# Patient Record
Sex: FEMALE | Race: WHITE
[De-identification: names, ages, dates, MRNs, and addresses within clinical notes are randomized per-mention and may not be internally consistent; named-entity substitution may affect disease eponyms.]

---

## 2021-04-19 ENCOUNTER — HOSPITAL ENCOUNTER (EMERGENCY)
Dept: HOSPITAL 5 - ED | Age: 81
Discharge: LEFT BEFORE DISCHARGE | End: 2021-04-19
Payer: MEDICARE

## 2021-04-19 VITALS — DIASTOLIC BLOOD PRESSURE: 57 MMHG | SYSTOLIC BLOOD PRESSURE: 130 MMHG

## 2021-04-19 DIAGNOSIS — Z53.21: ICD-10-CM

## 2021-04-19 DIAGNOSIS — H92.01: Primary | ICD-10-CM

## 2021-04-19 NOTE — EVENT NOTE
ED Screening Note


Date of service: 04/19/21


Time: 14:09


ED Screening Note: 


80-year-old female patient with history of diabetes, hypertension, and end-stage

renal disease on dialysis presents to the emergency department with complaints 

of progressively worsening right-sided headache for several months.  Patient 

states she feels like "her scalp is burning."  Patient attends dialysis 

Monday/Wednesday/Friday.  She did go to dialysis earlier today.  No history of 

head trauma.  





General: Awake, appropriately interactive, no acute distress. 


ENT: Normal otoscopic exam.


Neck: Supple. Full range of motion intact. 


Cardiovascular: Normal peripheral perfusion. 


Pulmonary: No respiratory distress. Patient is speaking normally without use of 

accessory muscles.


Skin: No apparent rashes or lesions. 


Neurological: No facial asymmetry. Speech is clear. Follows commands. Patient is

alert and oriented. 


Musculoskeletal: Moves all four extremities spontaneously with normal range of 

motion. 


Psych: Cooperative. Appropriate mood and affect.





I have greeted and performed a focused rapid initial assessment of this patient.

 A comprehensive ED assessment and evaluation of the patient, analysis of all 

test results, and completion of the medical decision-making process will be 

conducted by additional ED providers. This initial assessment/diagnostic 

orders/clinical plan/treatment(s) is/are subject to change based on patients 

health status, clinical progression and re-assessment. Further treatment and 

workup at subsequent clinical provider's discretion. Patient/guardian urged not 

to elope from the ED as their condition may be serious if not clinically 

assessed and managed.

## 2021-05-08 ENCOUNTER — HOSPITAL ENCOUNTER (EMERGENCY)
Dept: HOSPITAL 5 - ED | Age: 81
Discharge: HOME | End: 2021-05-08
Payer: MEDICARE

## 2021-05-08 VITALS — SYSTOLIC BLOOD PRESSURE: 124 MMHG | DIASTOLIC BLOOD PRESSURE: 78 MMHG
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
## 2021-05-08 DIAGNOSIS — Z79.899: ICD-10-CM

## 2021-05-08 DIAGNOSIS — R53.81: Primary | ICD-10-CM

## 2021-05-08 DIAGNOSIS — I10: ICD-10-CM

## 2021-05-08 DIAGNOSIS — T50.B95A: ICD-10-CM

## 2021-05-08 DIAGNOSIS — Z90.710: ICD-10-CM

## 2021-05-08 LAB
ALBUMIN SERPL-MCNC: 3.6 G/DL (ref 3.9–5)
ALT SERPL-CCNC: 17 UNITS/L (ref 7–56)
BAND NEUTROPHILS # (MANUAL): 0 K/MM3
BUN SERPL-MCNC: 34 MG/DL (ref 7–17)
BUN/CREAT SERPL: 5 %
CALCIUM SERPL-MCNC: 9.3 MG/DL (ref 8.4–10.2)
HCT VFR BLD CALC: 39.1 % (ref 30.3–42.9)
HEMOLYSIS INDEX: 3
HGB BLD-MCNC: 12.3 GM/DL (ref 10.1–14.3)
MCHC RBC AUTO-ENTMCNC: 32 % (ref 30–34)
MCV RBC AUTO: 88 FL (ref 79–97)
MYELOCYTES # (MANUAL): 0 K/MM3
PLATELET # BLD: 165 K/MM3 (ref 140–440)
PROMYELOCYTES # (MANUAL): 0 K/MM3
RBC # BLD AUTO: 4.42 M/MM3 (ref 3.65–5.03)
TOTAL CELLS COUNTED BLD: 100

## 2021-05-08 PROCEDURE — 85025 COMPLETE CBC W/AUTO DIFF WBC: CPT

## 2021-05-08 PROCEDURE — 85007 BL SMEAR W/DIFF WBC COUNT: CPT

## 2021-05-08 PROCEDURE — 80053 COMPREHEN METABOLIC PANEL: CPT

## 2021-05-08 PROCEDURE — 71046 X-RAY EXAM CHEST 2 VIEWS: CPT

## 2021-05-08 PROCEDURE — 36415 COLL VENOUS BLD VENIPUNCTURE: CPT

## 2021-05-08 NOTE — XRAY REPORT
CHEST 2 VIEWS 



INDICATION / CLINICAL INFORMATION:

cough.



COMPARISON: 

None available.



FINDINGS:



SUPPORT DEVICES: None.



HEART / MEDIASTINUM: No significant abnormality. 



LUNGS / PLEURA: No significant pulmonary or pleural abnormality. No pneumothorax. 



ADDITIONAL FINDINGS: There are some likely venous vascular stents in the chest.



IMPRESSION:

1. No acute findings.



Signer Name: Moise Packer MD 

Signed: 5/8/2021 8:56 AM

Workstation Name: [id number]

## 2021-05-08 NOTE — EVENT NOTE
ED Screening Note


Date of service: 05/08/21


Time: 08:29


ED Screening Note: 


This 80-year-old female with end-stage renal disease on dialysis 3 times a day 

last dialyzed yesterday presents to ED complaining of generalized body aches, 

coughing and bilateral leg pain times couple of days.





This initial assessment/diagnostic orders/clinical plan/treatment(s) is/are 

subject to change based on patients health status, clinical progression and re-

assessment by fellow clinical providers in the ED. Further treatment and workup 

at subsequent clinical providers discretion. Patient/guardian urged not to elope

from the ED as their condition may be serious if not clinically assessed and 

managed. 





Initial orders include: 


Labs, chest x-ray.

## 2021-05-08 NOTE — EMERGENCY DEPARTMENT REPORT
ED General Adult HPI





- General


Chief complaint: Pain General


Stated complaint: BODY PAIN


Time Seen by Provider: 05/08/21 09:31


Source: patient


Mode of arrival: Ambulatory


Limitations: No Limitations





- History of Present Illness


Initial comments: 





Chief complaint: My whole body hurts.





HPI: This is a 80-year-old female with history of hypertension, end-stage renal 

disease on hemodialysis for the past 15 years.  She presents with diffuse body 

aches 2 days after receiving second dose of COVID-19 vaccine.  She did not have 

any side effects with the first dose.  She has chills cough body aches 

generalized fatigue.  Last hemodialysis on yesterday.


-: Gradual, days(s) (Past 3 days, 2 days after receiving COVID-19 vaccine)


Location: head, neck, left, right, upper extremity, lower extremity


Quality: aching


Consistency: constant


Improves with: medication (Tylenol)


Worsens with: none


Associated Symptoms: cough, fever/chills (Chills), malaise





- Related Data


                                  Previous Rx's











 Medication  Instructions  Recorded  Last Taken  Type


 


traMADoL [Ultram 50 MG tab] 50 mg PO Q6HR PRN #15 tablet 05/08/21 Unknown Rx











                                    Allergies











Allergy/AdvReac Type Severity Reaction Status Date / Time


 


No Known Allergies Allergy   Unverified 05/08/21 08:02














ED Review of Systems


ROS: 


Stated complaint: BODY PAIN


Other details as noted in HPI





Comment: All other systems reviewed and negative


Constitutional: chills, malaise.  denies: fever


Respiratory: cough


Gastrointestinal: denies: abdominal pain, nausea, vomiting


Musculoskeletal: myalgia





ED Past Medical Hx





- Past Medical History


Previous Medical History?: Yes


Hx Hypertension: Yes


Hx Renal Disease: Yes (Dialysis pt)





- Surgical History


Past Surgical History?: Yes


Additional Surgical History: Hysterectomy





- Social History


Smoking Status: Never Smoker


Substance Use Type: None





- Medications


Home Medications: 


                                Home Medications











 Medication  Instructions  Recorded  Confirmed  Last Taken  Type


 


traMADoL [Ultram 50 MG tab] 50 mg PO Q6HR PRN #15 tablet 05/08/21  Unknown Rx














ED Physical Exam





- General


Limitations: No Limitations


General appearance: alert, in no apparent distress, other (Appears well appears 

comfortable nontoxic-appearing)





- Head


Head exam: Present: atraumatic, normocephalic





- Eye


Eye exam: Present: normal appearance





- ENT


ENT exam: Present: mucous membranes moist





- Neck


Neck exam: Present: normal inspection, full ROM





- Respiratory


Respiratory exam: Present: normal lung sounds bilaterally.  Absent: respiratory 

distress, wheezes, rales, rhonchi





- Cardiovascular


Cardiovascular Exam: Present: regular rate, normal rhythm, normal heart sounds. 

Absent: systolic murmur, diastolic murmur, rubs, gallop





- GI/Abdominal


GI/Abdominal exam: Present: soft, normal bowel sounds.  Absent: distended, 

tenderness, guarding, rebound





- Extremities Exam


Extremities exam: Present: normal inspection





- Neurological Exam


Neurological exam: Present: alert, oriented X3





- Psychiatric


Psychiatric exam: Present: normal affect, normal mood





- Skin


Skin exam: Present: warm, dry, intact, normal color.  Absent: rash





ED Course





                                   Vital Signs











  05/08/21 05/08/21





  07:57 09:49


 


Temperature 98.5 F 


 


Pulse Rate 98 H 88


 


Respiratory 12 18





Rate  


 


Blood Pressure 125/62 


 


Blood Pressure  124/78





[Left]  


 


O2 Sat by Pulse 97 98





Oximetry  














ED Medical Decision Making





- Lab Data


Result diagrams: 


                                 05/08/21 09:15





                                 05/08/21 09:15








                         Laboratory Results - last 24 hr











  05/08/21 05/08/21





  09:15 09:15


 


WBC  5.5 


 


RBC  4.42 


 


Hgb  12.3 


 


Hct  39.1 


 


MCV  88 


 


MCH  28 


 


MCHC  32 


 


RDW  17.3 H 


 


Plt Count  165 


 


Mono % (Auto)  Np 


 


Sodium   143


 


Potassium   4.4


 


Chloride   100.7


 


Carbon Dioxide   30


 


Anion Gap   17


 


BUN   34 H


 


Creatinine   6.7 H


 


Estimated GFR   7


 


BUN/Creatinine Ratio   5


 


Glucose   81


 


Calcium   9.3


 


Total Bilirubin   0.30


 


AST   23


 


ALT   17


 


Alkaline Phosphatase   182 H


 


Total Protein   7.3


 


Albumin   3.6 L


 


Albumin/Globulin Ratio   1.0














- Radiology Data


Radiology results: report reviewed





Chest radiograph: No acute findings according to radiology report





- Medical Decision Making





Clinical impression: Adverse effects of COVID-19 vaccine, secondary 

consideration COVID-19 infection in spite COVID-19 vaccine, no evidence of s

epsis.  CBC chemistry unremarkable.  Chemistry reflects end-stage renal disease 

with normal potassium no acidosis. 





Vital signs are normal.  Patient appears well.  I prescribed tramadol.  

Recommended increase rest and hydration as well as continue Tylenol with chest 

helped her symptoms.  She recently moved from [place] to be closer to her

 granddaughter who lives in [facility].  I have referred her to our 

outpatient medicine physician for follow-up as needed.


Critical care attestation.: 


If time is entered above; I have spent that time in minutes in the direct care 

of this critically ill patient, excluding procedure time.








ED Disposition


Clinical Impression: 


 Adverse effect of COVID-19 vaccine





Disposition: DC-01 TO HOME OR SELFCARE


Is pt being admited?: No


Does the pt Need Aspirin: No


Condition: Stable


Additional Instructions: 


Please return to the ER if your symptoms worsen.  Otherwise, rest and hydrate.  

Continue Tylenol use.


Prescriptions: 


traMADoL [Ultram 50 MG tab] 50 mg PO Q6HR PRN #15 tablet


 PRN Reason: Pain


Referrals: 


AMERICO MIRANDA [Primary Care Provider] - 3-5 Days


KODY DOLAN MD [Staff Physician] - 3-5 Days

## 2021-08-06 ENCOUNTER — HOSPITAL ENCOUNTER (INPATIENT)
Dept: HOSPITAL 5 - ED | Age: 81
LOS: 5 days | Discharge: HOME HEALTH | DRG: 871 | End: 2021-08-11
Attending: INTERNAL MEDICINE | Admitting: INTERNAL MEDICINE
Payer: MEDICARE

## 2021-08-06 DIAGNOSIS — Z99.2: ICD-10-CM

## 2021-08-06 DIAGNOSIS — Z20.822: ICD-10-CM

## 2021-08-06 DIAGNOSIS — D72.829: ICD-10-CM

## 2021-08-06 DIAGNOSIS — D50.0: ICD-10-CM

## 2021-08-06 DIAGNOSIS — E66.9: ICD-10-CM

## 2021-08-06 DIAGNOSIS — N18.6: ICD-10-CM

## 2021-08-06 DIAGNOSIS — E87.70: ICD-10-CM

## 2021-08-06 DIAGNOSIS — A41.9: Primary | ICD-10-CM

## 2021-08-06 DIAGNOSIS — I12.0: ICD-10-CM

## 2021-08-06 DIAGNOSIS — J96.01: ICD-10-CM

## 2021-08-06 DIAGNOSIS — Z90.710: ICD-10-CM

## 2021-08-06 DIAGNOSIS — J18.9: ICD-10-CM

## 2021-08-06 DIAGNOSIS — E87.1: ICD-10-CM

## 2021-08-06 DIAGNOSIS — Z82.49: ICD-10-CM

## 2021-08-06 LAB
ALBUMIN SERPL-MCNC: 3.8 G/DL (ref 3.9–5)
ALT SERPL-CCNC: 14 UNITS/L (ref 7–56)
BUN SERPL-MCNC: 55 MG/DL (ref 7–17)
BUN/CREAT SERPL: 5 %
CALCIUM SERPL-MCNC: 9.6 MG/DL (ref 8.4–10.2)
HCT VFR BLD CALC: 33.1 % (ref 30.3–42.9)
HEMOLYSIS INDEX: 8
HGB BLD-MCNC: 10.7 GM/DL (ref 10.1–14.3)
INR PPP: 1.23 (ref 0.87–1.13)
MCHC RBC AUTO-ENTMCNC: 32 % (ref 30–34)
MCV RBC AUTO: 90 FL (ref 79–97)
PLATELET # BLD: 155 K/MM3 (ref 140–440)
RBC # BLD AUTO: 3.68 M/MM3 (ref 3.65–5.03)

## 2021-08-06 PROCEDURE — 96374 THER/PROPH/DIAG INJ IV PUSH: CPT

## 2021-08-06 PROCEDURE — 93005 ELECTROCARDIOGRAM TRACING: CPT

## 2021-08-06 PROCEDURE — 85007 BL SMEAR W/DIFF WBC COUNT: CPT

## 2021-08-06 PROCEDURE — 71045 X-RAY EXAM CHEST 1 VIEW: CPT

## 2021-08-06 PROCEDURE — 82805 BLOOD GASES W/O2 SATURATION: CPT

## 2021-08-06 PROCEDURE — 87040 BLOOD CULTURE FOR BACTERIA: CPT

## 2021-08-06 PROCEDURE — 80074 ACUTE HEPATITIS PANEL: CPT

## 2021-08-06 PROCEDURE — 85025 COMPLETE CBC W/AUTO DIFF WBC: CPT

## 2021-08-06 PROCEDURE — 36415 COLL VENOUS BLD VENIPUNCTURE: CPT

## 2021-08-06 PROCEDURE — 80053 COMPREHEN METABOLIC PANEL: CPT

## 2021-08-06 PROCEDURE — 85610 PROTHROMBIN TIME: CPT

## 2021-08-06 PROCEDURE — 82962 GLUCOSE BLOOD TEST: CPT

## 2021-08-06 PROCEDURE — 80048 BASIC METABOLIC PNL TOTAL CA: CPT

## 2021-08-06 PROCEDURE — U0003 INFECTIOUS AGENT DETECTION BY NUCLEIC ACID (DNA OR RNA); SEVERE ACUTE RESPIRATORY SYNDROME CORONAVIRUS 2 (SARS-COV-2) (CORONAVIRUS DISEASE [COVID-19]), AMPLIFIED PROBE TECHNIQUE, MAKING USE OF HIGH THROUGHPUT TECHNOLOGIES AS DESCRIBED BY CMS-2020-01-R: HCPCS

## 2021-08-06 PROCEDURE — 82140 ASSAY OF AMMONIA: CPT

## 2021-08-06 NOTE — XRAY REPORT
CHEST 1 VIEW 8/6/2021 8:53 PM



INDICATION / CLINICAL INFORMATION: possible Sepsis.



COMPARISON: 5/8/2021.



FINDINGS:



SUPPORT DEVICES: None.

HEART / MEDIASTINUM: No significant abnormality. 

LUNGS / PLEURA: Dense infiltrate right upper lobe. No pneumothorax. 



ADDITIONAL FINDINGS: No significant additional findings.



IMPRESSION: Right upper lobe pneumonia. Recommend follow-up to resolution.



Signer Name: Joce Martinez MD 

Signed: 8/6/2021 9:57 PM

Workstation Name: [id number]

## 2021-08-07 LAB
ANISOCYTOSIS BLD QL SMEAR: (no result)
BAND NEUTROPHILS # (MANUAL): 0 K/MM3
MYELOCYTES # (MANUAL): 0 K/MM3
PROMYELOCYTES # (MANUAL): 0 K/MM3
TOTAL CELLS COUNTED BLD: 100

## 2021-08-07 PROCEDURE — 5A1D70Z PERFORMANCE OF URINARY FILTRATION, INTERMITTENT, LESS THAN 6 HOURS PER DAY: ICD-10-PCS | Performed by: INTERNAL MEDICINE

## 2021-08-07 RX ADMIN — CEFTRIAXONE SODIUM SCH MLS/HR: 2 INJECTION, POWDER, FOR SOLUTION INTRAMUSCULAR; INTRAVENOUS at 11:15

## 2021-08-07 RX ADMIN — AZITHROMYCIN SCH MLS/HR: 500 INJECTION, POWDER, LYOPHILIZED, FOR SOLUTION INTRAVENOUS at 21:56

## 2021-08-07 NOTE — CONSULTATION
History of Present Illness





- Reason for Consult


end stage renal disease


Requesting physician: AMY J KOCHERLA





- History of Present Illness





This is a 79 yo F with past medical history of Hypertension, ESRD on HD MWF, who

presents with fever, SOB, WILKINSON,  productive cough. Pt also reports multiple falls

over the lat 2 days. Pt denies loss of consciousness, reports the falls were 

mechanical in nature. She received 2 doses of the Covid vaccine in March and 

April. CXR showed evidence of RUL pneumonia. Pt reports that her last HD was 

performed on Wed. Could not go to her maintenance HD yesterday. Renal consult is

requested for management of ESRD/HD. 





Past History


Past Medical History: dialysis, ESRD, hypertension





Medications and Allergies


                                    Allergies











Allergy/AdvReac Type Severity Reaction Status Date / Time


 


No Known Allergies Allergy   Unverified 05/08/21 08:02











                                Home Medications











 Medication  Instructions  Recorded  Confirmed  Last Taken  Type


 


traMADoL [Ultram 50 MG tab] 50 mg PO Q6HR PRN #15 tablet 05/08/21  Unknown Rx











Active Meds: 


Active Medications





Ceftriaxone Sodium (Rocephin/Ns 2 Gm/100 Ml)  2 gm in 100 mls @ 200 mls/hr IV 

Q24H [facility]; Protocol


   Last Admin: 08/07/21 11:15 Dose:  200 mls/hr


   Documented by: 


Sodium Chloride (Nacl 0.9%)  100 mls @ 999 mls/hr IV MICHELLE PRN


   PRN Reason: Hypotension











Review of Systems


All systems: negative





Exam





- Vital Signs


Vital signs: 


                                   Vital Signs











Temp Pulse Resp BP Pulse Ox


 


 103.1 F H  121 H  16   127/54   93 


 


 08/06/21 21:22  08/06/21 21:22  08/06/21 21:22  08/06/21 21:22  08/06/21 21:22














Results





- Lab Results





                                 08/06/21 21:40





                                 08/06/21 21:40


                             Most recent lab results











Calcium  9.6 mg/dL (8.4-10.2)   08/06/21  21:40    














Assessment and Plan





- Patient Problems


(1) End-stage renal disease on hemodialysis


Current Visit: Yes   Status: Acute   


Plan to address problem: 


HD arranged for today with target UF at 1-2L as tolerated. Pt understands risks 

and benefit of maintenance HD, consented to HD. Will resume HD on MWF schedule 








(2) Community acquired pneumonia


Current Visit: Yes   Status: Acute   


Plan to address problem: 


cont ABX treatment cetriaxone 








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 

end stage renal disease


Current Visit: Yes   Status: Acute   


Plan to address problem: 


monitor BP off meds 








(4) Anemia in chronic illness


Current Visit: Yes   Status: Acute   


Plan to address problem: 


Hb at target. no further EPO needed.

## 2021-08-07 NOTE — HISTORY AND PHYSICAL REPORT
History of Present Illness


Date of examination: 08/07/21


Date of admission: 


8/7/2021


Chief complaint: 


Fever, cough and worsening shortness of breath





History of present illness: 


79 yo female patient with hx of ESRD on HD MWF, hypertension  presents to the 

emergency room with fever cough brown-yellow mucus production.  Patient also 

gives history of recurrent falls , She has fever, chills, sore throat, headache,

poor appetite diarrhea.  She received 2 doses of the Covid vaccine in March and 

April. ,  Patient missed her dialysis yesterday as she was not feeling well.


Initial work-up in the emergency room is consistent with, right upper lobe 

pneumonia on chest x-ray, mild leukocytosis, mild hyponatremia probably 

secondary to fluid overload.


Though the patient was vaccinated, in view of fever pneumonia and cough, high 

suspicion for COVID-19, corona PCR was sent.


Patient feels slightly better this morning.


Denies headache dizziness , however complains of generalized weakness


Denies nausea vomiting or abdominal pain


Denies chest pain or palpitation








Past History


Past Medical History: dialysis, ESRD, hypertension


Past Surgical History: Other (AV graft)


Social history: denies: smoking, alcohol abuse, prescription drug abuse


Family history: hypertension





Medications and Allergies


                                    Allergies











Allergy/AdvReac Type Severity Reaction Status Date / Time


 


No Known Allergies Allergy   Unverified 05/08/21 08:02











                                Home Medications











 Medication  Instructions  Recorded  Confirmed  Last Taken  Type


 


traMADoL [Ultram 50 MG tab] 50 mg PO Q6HR PRN #15 tablet 05/08/21  Unknown Rx














Review of Systems


Constitutional: fever, chills, weakness, no weight loss, no weight gain


Ears, nose, mouth and throat: no nasal congestion, no nasal discharge


Cardiovascular: shortness of breath, dyspnea on exertion, no chest pain, no 

orthopnea, no palpitations


Respiratory: cough with sputum, shortness of breath, dyspnea on exertion


Gastrointestinal: diarrhea, no abdominal pain, no nausea, no vomiting


Genitourinary Female: no flank pain, no dysuria


Musculoskeletal: no myalgias, no arthritis


Integumentary: no rash, no lesions


Neurological: weakness, other


Psychiatric: no anxiety, no depression


Endocrine: no cold intolerance, no heat intolerance


Hematologic/Lymphatic: no easy bruising, no easy bleeding


Allergic/Immunologic: no urticaria, no allergic rhinitis





Exam





- Constitutional


Vitals: 


                                        











Temp Pulse Resp BP Pulse Ox


 


 100.0 F H  111 H  20   134/54   100 


 


 08/07/21 07:23  08/07/21 07:23  08/07/21 08:31  08/07/21 07:23  08/07/21 08:31











General appearance: Present: mild distress, obese





- EENT


Eyes: Present: PERRL, EOM intact





- Neck


Neck: Present: supple, normal ROM





- Respiratory


Respiratory effort: normal


Respiratory: right: rhonchi, bilateral: diminished, rales, negative: wheezing





- Cardiovascular


Rhythm: regular


Heart Sounds: Present: S1 & S2





- Extremities


Extremities: no ischemia, No edema


Extremity abnormal: edema





- Abdominal


General gastrointestinal: Present: soft, non-tender, non-distended, normal bowel

sounds





- Integumentary


Integumentary: Present: clear, warm





- Musculoskeletal


Musculoskeletal: strength equal bilaterally, generalized weakness





- Psychiatric


Psychiatric: appropriate mood/affect, other (confused at times)





- Neurologic


Neurologic: moves all extremities





Results





- Labs


CBC & Chem 7: 


                                 08/06/21 21:40





                                 08/06/21 21:40


Labs: 


                              Abnormal lab results











  08/06/21 08/06/21 08/06/21 Range/Units





  21:40 21:40 21:40 


 


WBC  11.1 H    (4.5-11.0)  K/mm3


 


RDW  18.0 H    (13.2-15.2)  %


 


Seg Neuts % (Manual)  84.0 H    (40.0-70.0)  %


 


Lymphocytes % (Manual)  7.0 L    (13.4-35.0)  %


 


Monocytes % (Manual)  9.0 H    (0.0-7.3)  %


 


Seg Neutrophils # Man  9.3 H    (1.8-7.7)  K/mm3


 


Lymphocytes # (Manual)  0.8 L    (1.2-5.4)  K/mm3


 


Monocytes # (Manual)  1.0 H    (0.0-0.8)  K/mm3


 


PT   16.0 H   (12.2-14.9)  Sec.


 


INR   1.23 H   (0.87-1.13)  


 


VBG pH     (7.320-7.420)  


 


Sodium    133 L  (137-145)  mmol/L


 


Chloride    90.3 L  ()  mmol/L


 


BUN    55 H  (7-17)  mg/dL


 


Creatinine    10.3 H  (0.6-1.2)  mg/dL


 


Glucose    203 H  ()  mg/dL


 


Alkaline Phosphatase    132 H  ()  units/L


 


Albumin    3.8 L  (3.9-5)  g/dL














  08/06/21 Range/Units





  21:40 


 


WBC   (4.5-11.0)  K/mm3


 


RDW   (13.2-15.2)  %


 


Seg Neuts % (Manual)   (40.0-70.0)  %


 


Lymphocytes % (Manual)   (13.4-35.0)  %


 


Monocytes % (Manual)   (0.0-7.3)  %


 


Seg Neutrophils # Man   (1.8-7.7)  K/mm3


 


Lymphocytes # (Manual)   (1.2-5.4)  K/mm3


 


Monocytes # (Manual)   (0.0-0.8)  K/mm3


 


PT   (12.2-14.9)  Sec.


 


INR   (0.87-1.13)  


 


VBG pH  7.474 H  (7.320-7.420)  


 


Sodium   (137-145)  mmol/L


 


Chloride   ()  mmol/L


 


BUN   (7-17)  mg/dL


 


Creatinine   (0.6-1.2)  mg/dL


 


Glucose   ()  mg/dL


 


Alkaline Phosphatase   ()  units/L


 


Albumin   (3.9-5)  g/dL














Assessment and Plan


-- Febrile illness;


Current Visit: Yes   Status: Acute   


Multifactorial, due to pneumonia, PUI


Possible Covid, patient already vaccinated


Antipyretics, blood cultures


Supportive care





--PUI/high suspicion for Covid


Current Visit: Yes   Status: Acute 


Droplet and contact isolation


Corona PCR test, supportive care


Oxygen evaluation





--Right upper lobe pneumonia/community-acquired


Current Visit: Yes   Status: Acute


Empiric antibiotics, Rocephin and Zithromax


Blood cultures, sputum cultures


Supportive care


Consider aspiration pneumonia





--end-stage renal disease on HD


Current Visit: Yes   Status: Chronic


Nephrology consulted


Hemodialysis per schedule





--Mild hyponatremia;


Current Visit: Yes   Status: Acute 


 probably secondary to fluid overload


Hemodialysis per schedule





--obesity; BMI 36.6


Current Visit: Yes   Status: Acute 


Patient needs weight reduction





--DVT prophylaxis


Current Visit: Yes   Status: Acute 


Heparin renal dose





--Full CODE STATUS





We will closely monitor the patient and adjust management as needed


Plan of care reviewed with the patient and her nurse


Follow nephrology consultation and recommendations





I spent total 55 minutes coordinating this discharge

## 2021-08-08 LAB
ANISOCYTOSIS BLD QL SMEAR: (no result)
BAND NEUTROPHILS # (MANUAL): 0 K/MM3
BUN SERPL-MCNC: 42 MG/DL (ref 7–17)
BUN/CREAT SERPL: 5 %
CALCIUM SERPL-MCNC: 9.4 MG/DL (ref 8.4–10.2)
HCT VFR BLD CALC: 30 % (ref 30.3–42.9)
HEMOLYSIS INDEX: 15
HGB BLD-MCNC: 9.9 GM/DL (ref 10.1–14.3)
MCHC RBC AUTO-ENTMCNC: 33 % (ref 30–34)
MCV RBC AUTO: 89 FL (ref 79–97)
MYELOCYTES # (MANUAL): 0 K/MM3
PLATELET # BLD: 147 K/MM3 (ref 140–440)
PROMYELOCYTES # (MANUAL): 0 K/MM3
RBC # BLD AUTO: 3.39 M/MM3 (ref 3.65–5.03)
TOTAL CELLS COUNTED BLD: 100

## 2021-08-08 RX ADMIN — CEFTRIAXONE SODIUM SCH MLS/HR: 2 INJECTION, POWDER, FOR SOLUTION INTRAMUSCULAR; INTRAVENOUS at 10:40

## 2021-08-08 RX ADMIN — FAMOTIDINE SCH MG: 20 TABLET ORAL at 10:39

## 2021-08-08 RX ADMIN — AZITHROMYCIN SCH MLS/HR: 500 INJECTION, POWDER, LYOPHILIZED, FOR SOLUTION INTRAVENOUS at 10:39

## 2021-08-08 NOTE — ELECTROCARDIOGRAPH REPORT
[facility], [place]

                                       

Test Date:    2021               Test Time:    21:30:22

Pat Name:     EFREN GRANT            Department:   

Patient ID:   [id number]          Room:         A465

Gender:       F                        Technician:   

:          [id number]               Requested By: ED DOC

Order Number: [id number]              Reading MD:   Alice Perales

                                 Measurements

Intervals                              Axis          

Rate:         121                      P:            51

LA:           160                      QRS:          -33

QRSD:         100                      T:            68

QT:           306                                    

QTc:          434                                    

                           Interpretive Statements

Sinus tachycardia

Left axis deviation

No previous ECG available for comparison

Electronically Signed On 2021 13:33:20 EDT by Alice Perales

## 2021-08-08 NOTE — PROGRESS NOTE
Assessment and Plan





- Patient Problems


(1) End-stage renal disease on hemodialysis


Current Visit: Yes   Status: Acute   


Plan to address problem: 


Cont HD on MWF schedule 








(2) Community acquired pneumonia


Current Visit: Yes   Status: Acute   


Plan to address problem: 


cont ABX treatment cetriaxone 








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 

end stage renal disease


Current Visit: Yes   Status: Acute   


Plan to address problem: 


monitor BP off meds 








(4) Anemia in chronic illness


Current Visit: Yes   Status: Acute   


Plan to address problem: 


Hb at target. no further EPO needed. 








Subjective


Date of service: 08/08/21


Principal diagnosis: ESRD


Interval history: 





Pt awake, alert, in no acute distress 





Objective





- Vital Signs


Vital signs: 


                               Vital Signs - 12hr











  08/07/21 08/08/21 08/08/21





  22:01 00:23 00:57


 


Temperature   98 F


 


Pulse Rate 119 H 104 H 


 


Respiratory 23 23 





Rate   


 


Blood Pressure 112/59 110/51 


 


O2 Sat by Pulse 99 100 





Oximetry   














  08/08/21 08/08/21 08/08/21





  01:00 01:30 02:00


 


Temperature   


 


Pulse Rate 98 H 97 H 93 H


 


Respiratory 22 23 22





Rate   


 


Blood Pressure 93/47 117/55 120/47


 


O2 Sat by Pulse 99 99 100





Oximetry   














  08/08/21 08/08/21 08/08/21





  02:30 03:00 03:30


 


Temperature   


 


Pulse Rate 97 H 92 H 92 H


 


Respiratory 22 22 20





Rate   


 


Blood Pressure 113/46 113/49 98/46


 


O2 Sat by Pulse 99 100 99





Oximetry   














  08/08/21 08/08/21 08/08/21





  04:00 05:00 05:30


 


Temperature   


 


Pulse Rate 95 H 101 H 93 H


 


Respiratory 22 18 15





Rate   


 


Blood Pressure 94/46 134/50 150/63


 


O2 Sat by Pulse 98 97 100





Oximetry   














  08/08/21 08/08/21





  06:00 06:30


 


Temperature  


 


Pulse Rate 89 92 H


 


Respiratory 15 16





Rate  


 


Blood Pressure 135/68 149/61


 


O2 Sat by Pulse 99 93





Oximetry  














- General Appearance


General appearance: well-developed, well-nourished, appears stated age


EENT: ATNC, PERRL, mucous membranes moist


Neck: no JVD


Respiratory: Present: Clear to Ascultation


Cardiology: regular, S1S2


Gastrointestinal: normoactive bowel sounds


Integumentary: no rash, other (no edema )


Neurologic: no focal deficit, alert and oriented x3, strength 5/5, CN 3-12 

intact


Psychiatric: mood/affect appropriate, cooperative





- Lab





                                 08/08/21 05:33





                                 08/08/21 05:33


                             Most recent lab results











Calcium  9.4 mg/dL (8.4-10.2)   08/08/21  05:33    














Medications & Allergies





- Medications


Allergies/Adverse Reactions: 


                                    Allergies





No Known Allergies Allergy (Unverified 05/08/21 08:02)


   








Home Medications: 


                                Home Medications











 Medication  Instructions  Recorded  Confirmed  Last Taken  Type


 


traMADoL [Ultram 50 MG tab] 50 mg PO Q6HR PRN #15 tablet 05/08/21  Unknown Rx











Active Medications: 














Generic Name Dose Route Start Last Admin





  Trade Name Freq  PRN Reason Stop Dose Admin


 


Acetaminophen  650 mg  08/07/21 18:04  08/07/21 21:55





  Acetaminophen 325 Mg Tab  PO   650 mg





  Q6H PRN   Administration





  Pain, Mild (1-3)  


 


Famotidine  20 mg  08/08/21 10:00 





  Famotidine 20 Mg Tab  PO  





  QDAY BEATRIS  


 


Ceftriaxone Sodium  2 gm in 100 mls @ 200 mls/hr  08/07/21 10:00  08/07/21 11:15





  Rocephin/Ns 2 Gm/100 Ml  IV  08/11/21 11:59  200 mls/hr





  Q24H BEATRIS   Administration





  Protocol  


 


Sodium Chloride  100 mls @ 999 mls/hr  08/07/21 11:40 





  Nacl 0.9%  IV  





  MICHELLE PRN  





  Hypotension  


 


Azithromycin  500 mg in 250 mls @ 250 mls/hr  08/07/21 20:00  08/07/21 21:56





  Zithromax/Ns  IV  08/11/21 10:59  250 mls/hr





  Q24HR BEATRIS   Administration


 


Tramadol HCl  50 mg  08/07/21 19:10 





  Tramadol 50 Mg Tab  PO  





  Q6H PRN  





  Pain, Moderate (4-6)

## 2021-08-08 NOTE — PROGRESS NOTE
Assessment and Plan


Assessment and plan: 


--COVID-19 test is negative





-- Febrile illness;


Current Visit: Yes   Status: Acute   


Multifactorial, due to pneumonia, PUI


Possible Covid, patient already vaccinated


Antipyretics, blood cultures


Supportive care





--Right upper lobe pneumonia/community-acquired


Current Visit: Yes   Status: Acute


Empiric antibiotics, Rocephin and Zithromax


Blood cultures, sputum cultures


Supportive care


Consider aspiration pneumonia





--end-stage renal disease on HD


Current Visit: Yes   Status: Chronic


Nephrology consulted


Hemodialysis per schedule





--Mild hyponatremia;


Current Visit: Yes   Status: Acute 


 probably secondary to fluid overload


Hemodialysis per schedule





--obesity; BMI 36.6


Current Visit: Yes   Status: Acute 


Patient needs weight reduction





--DVT prophylaxis


Current Visit: Yes   Status: Acute 


Heparin renal dose





--Full CODE STATUS





We will closely monitor the patient and adjust management as needed


Plan of care reviewed with the patient and her nurse


Follow nephrology consultation and recommendations





Closely monitor the patient and adjust management as needed








Brief history;


80-year-old obese female patient with significant past medical history of 

hypertension end-stage renal disease on hemodialysis was admitted through 

emergency room with fever cough and initial work-up is consistent with right 

upper lobe pneumonia and fluid overload, patient was received stat hemodialysis 

with significant improvement, started on empiric antibiotics Rocephin and 

Zithromax, with mild improvement of symptoms, COVID-19 test is negative





8/8/2021; COVID-19 test is negative


Patient received hemodialysis yesterday with significant improvement of symptoms


Receiving antibiotics for right-sided pneumonia


We will get follow-up chest x-ray tomorrow morning








History


Interval history: 


I have seen and examined the patient this morning in ER awaiting bed assignment.


Patient feels slightly better after her dialysis


Denies any chest pain or shortness of breath


COVID-19 test is negative


Vital signs noted








Hospitalist Physical





- Constitutional


Vitals: 


                                        











Temp Pulse Resp BP Pulse Ox


 


 98 F   92 H  16   149/61   93 


 


 08/08/21 00:57  08/08/21 06:30  08/08/21 06:30  08/08/21 06:30  08/08/21 06:30











General appearance: Present: no acute distress, well-nourished, obese





- EENT


Eyes: Present: PERRL, EOM intact





- Neck


Neck: Present: supple, normal ROM





- Respiratory


Respiratory effort: normal


Respiratory: right: rhonchi, bilateral: diminished, rales, negative: wheezing





- Cardiovascular


Rhythm: regular


Heart Sounds: Present: S1 & S2





- Extremities


Extremities: no ischemia, No edema





- Abdominal


General gastrointestinal: soft, non-tender, non-distended, normal bowel sounds





- Integumentary


Integumentary: Present: clear, warm





- Psychiatric


Psychiatric: appropriate mood/affect, cooperative





- Neurologic


Neurologic: CNII-XII intact, moves all extremities





Results





- Labs


CBC & Chem 7: 


                                 08/08/21 05:33





                                 08/08/21 05:33


Labs: 


                             Laboratory Last Values











WBC  9.9 K/mm3 (4.5-11.0)   08/08/21  05:33    


 


RBC  3.39 M/mm3 (3.65-5.03)  L  08/08/21  05:33    


 


Hgb  9.9 gm/dl (10.1-14.3)  L  08/08/21  05:33    


 


Hct  30.0 % (30.3-42.9)  L  08/08/21  05:33    


 


MCV  89 fl (79-97)   08/08/21  05:33    


 


MCH  29 pg (28-32)   08/08/21  05:33    


 


MCHC  33 % (30-34)   08/08/21  05:33    


 


RDW  18.0 % (13.2-15.2)  H  08/08/21  05:33    


 


Plt Count  147 K/mm3 (140-440)   08/08/21  05:33    


 


Mono % (Auto)  Np   08/08/21  05:33    


 


Add Manual Diff  Complete   08/08/21  05:33    


 


Total Counted  100   08/08/21  05:33    


 


Seg Neuts % (Manual)  80.0 % (40.0-70.0)  H  08/08/21  05:33    


 


Lymphocytes % (Manual)  11.0 % (13.4-35.0)  L  08/08/21  05:33    


 


Monocytes % (Manual)  9.0 % (0.0-7.3)  H  08/08/21  05:33    


 


Nucleated RBC %  Not Reportable   08/08/21  05:33    


 


Seg Neutrophils # Man  7.9 K/mm3 (1.8-7.7)  H  08/08/21  05:33    


 


Band Neutrophils #  0.0 K/mm3  08/08/21  05:33    


 


Lymphocytes # (Manual)  1.1 K/mm3 (1.2-5.4)  L  08/08/21  05:33    


 


Abs React Lymphs (Man)  0.0 K/mm3  08/08/21  05:33    


 


Monocytes # (Manual)  0.9 K/mm3 (0.0-0.8)  H  08/08/21  05:33    


 


Eosinophils # (Manual)  0.0 K/mm3 (0.0-0.4)   08/08/21  05:33    


 


Basophils # (Manual)  0.0 K/mm3 (0.0-0.1)   08/08/21  05:33    


 


Metamyelocytes #  0.0 K/mm3  08/08/21  05:33    


 


Myelocytes #  0.0 K/mm3  08/08/21  05:33    


 


Promyelocytes #  0.0 K/mm3  08/08/21  05:33    


 


Blast Cells #  0.0 K/mm3  08/08/21  05:33    


 


WBC Morphology  Not Reportable   08/08/21  05:33    


 


Hypersegmented Neuts  Not Reportable   08/08/21  05:33    


 


Hyposegmented Neuts  Not Reportable   08/08/21  05:33    


 


Hypogranular Neuts  Not Reportable   08/08/21  05:33    


 


Smudge Cells  Not Reportable   08/08/21  05:33    


 


Toxic Granulation  Not Reportable   08/08/21  05:33    


 


Toxic Vacuolation  Not Reportable   08/08/21  05:33    


 


Dohle Bodies  Not Reportable   08/08/21  05:33    


 


Pelger-Huet Anomaly  Not Reportable   08/08/21  05:33    


 


Kindra Rods  Not Reportable   08/08/21  05:33    


 


Platelet Estimate  Consistent w auto   08/08/21  05:33    


 


Clumped Platelets  Not Reportable   08/08/21  05:33    


 


Plt Clumps, EDTA  Not Reportable   08/08/21  05:33    


 


Large Platelets  Not Reportable   08/08/21  05:33    


 


Giant Platelets  Not Reportable   08/08/21  05:33    


 


Platelet Satelliting  Not Reportable   08/08/21  05:33    


 


Plt Morphology Comment  Not Reportable   08/08/21  05:33    


 


RBC Morphology  Not Reportable   08/08/21  05:33    


 


Dimorphic RBCs  Not Reportable   08/08/21  05:33    


 


Polychromasia  Not Reportable   08/08/21  05:33    


 


Hypochromasia  Not Reportable   08/08/21  05:33    


 


Poikilocytosis  Not Reportable   08/08/21  05:33    


 


Anisocytosis  1+   08/08/21  05:33    


 


Microcytosis  Not Reportable   08/08/21  05:33    


 


Macrocytosis  Not Reportable   08/08/21  05:33    


 


Spherocytes  Not Reportable   08/08/21  05:33    


 


Pappenheimer Bodies  Not Reportable   08/08/21  05:33    


 


Sickle Cells  Not Reportable   08/08/21  05:33    


 


Target Cells  Not Reportable   08/08/21  05:33    


 


Tear Drop Cells  Not Reportable   08/08/21  05:33    


 


Ovalocytes  Not Reportable   08/08/21  05:33    


 


Helmet Cells  Not Reportable   08/08/21  05:33    


 


Velazquez-[place] Bodies  Not Reportable   08/08/21  05:33    


 


Cabot Rings  Not Reportable   08/08/21  05:33    


 


[place] Cells  Not Reportable   08/08/21  05:33    


 


Bite Cells  Not Reportable   08/08/21  05:33    


 


Crenated Cell  Not Reportable   08/08/21  05:33    


 


Elliptocytes  Not Reportable   08/08/21  05:33    


 


Acanthocytes (Spur)  Not Reportable   08/08/21  05:33    


 


Rouleaux  Not Reportable   08/08/21  05:33    


 


Hemoglobin C Crystals  Not Reportable   08/08/21  05:33    


 


Schistocytes  Not Reportable   08/08/21  05:33    


 


Malaria parasites  Not Reportable   08/08/21  05:33    


 


Lawson Bodies  Not Reportable   08/08/21  05:33    


 


Hem Pathologist Commnt  No   08/08/21  05:33    


 


PT  16.0 Sec. (12.2-14.9)  H  08/06/21  21:40    


 


INR  1.23  (0.87-1.13)  H  08/06/21  21:40    


 


VBG pH  7.474  (7.320-7.420)  H  08/06/21  21:40    


 


Sodium  136 mmol/L (137-145)  L  08/08/21  05:33    


 


Potassium  4.5 mmol/L (3.6-5.0)   08/08/21  05:33    


 


Chloride  92.4 mmol/L ()  L  08/08/21  05:33    


 


Carbon Dioxide  30 mmol/L (22-30)   08/08/21  05:33    


 


Anion Gap  18 mmol/L  08/08/21  05:33    


 


BUN  42 mg/dL (7-17)  H  08/08/21  05:33    


 


Creatinine  8.6 mg/dL (0.6-1.2)  H  08/08/21  05:33    


 


Estimated GFR  5 ml/min  08/08/21  05:33    


 


BUN/Creatinine Ratio  5 %  08/08/21  05:33    


 


Glucose  174 mg/dL ()  H  08/08/21  05:33    


 


Lactic Acid  1.70 mmol/L (0.7-2.0)   08/07/21  01:01    


 


Calcium  9.4 mg/dL (8.4-10.2)   08/08/21  05:33    


 


Total Bilirubin  0.80 mg/dL (0.1-1.2)   08/06/21  21:40    


 


AST  33 units/L (5-40)   08/06/21  21:40    


 


ALT  14 units/L (7-56)   08/06/21  21:40    


 


Alkaline Phosphatase  132 units/L ()  H  08/06/21  21:40    


 


Total Protein  6.7 g/dL (6.3-8.2)   08/06/21  21:40    


 


Albumin  3.8 g/dL (3.9-5)  L  08/06/21  21:40    


 


Albumin/Globulin Ratio  1.3 %  08/06/21  21:40    


 


Coronavirus (PCR)  Negative  (Negative)   08/07/21  10:30    


 


Hepatitis A IgM Ab  Non-reactive  (NonReactive)   08/07/21  12:18    


 


Hep Bs Antigen  Nonreactive  (Negative)   08/07/21  12:18    


 


Hep B Core IgM Ab  Non-reactive  (NonReactive)   08/07/21  12:18    


 


Hepatitis C Antibody  Non-reactive  (NonReactive)   08/07/21  12:18    











Microbiology: 


Microbiology





08/06/21 21:40   Peripheral/Venous   Blood Culture - Preliminary


                            NO GROWTH AFTER 24 HOURS


08/06/21 21:40   Peripheral/Venous   Blood Culture - Preliminary


                            NO GROWTH AFTER 24 HOURS











Active Medications





- Current Medications


Current Medications: 














Generic Name Dose Route Start Last Admin





  Trade Name Freq  PRN Reason Stop Dose Admin


 


Acetaminophen  650 mg  08/07/21 18:04  08/07/21 21:55





  Acetaminophen 325 Mg Tab  PO   650 mg





  Q6H PRN   Administration





  Pain, Mild (1-3)  


 


Famotidine  20 mg  08/08/21 10:00  08/08/21 10:39





  Famotidine 20 Mg Tab  PO   20 mg





  QDAY BEATRIS   Administration


 


Ceftriaxone Sodium  2 gm in 100 mls @ 200 mls/hr  08/07/21 10:00  08/08/21 10:40





  Rocephin/Ns 2 Gm/100 Ml  IV  08/11/21 11:59  200 mls/hr





  Q24H BEATRIS   Administration





  Protocol  


 


Sodium Chloride  100 mls @ 999 mls/hr  08/07/21 11:40 





  Nacl 0.9%  IV  





  MICHELLE PRN  





  Hypotension  


 


Azithromycin  500 mg in 250 mls @ 250 mls/hr  08/07/21 20:00  08/08/21 10:39





  Zithromax/Ns  IV  08/11/21 10:59  250 mls/hr





  Q24HR BEATRIS   Administration


 


Tramadol HCl  50 mg  08/07/21 19:10 





  Tramadol 50 Mg Tab  PO  





  Q6H PRN  





  Pain, Moderate (4-6)

## 2021-08-09 PROCEDURE — 5A1D70Z PERFORMANCE OF URINARY FILTRATION, INTERMITTENT, LESS THAN 6 HOURS PER DAY: ICD-10-PCS | Performed by: INTERNAL MEDICINE

## 2021-08-09 RX ADMIN — FAMOTIDINE SCH MG: 20 TABLET ORAL at 09:30

## 2021-08-09 RX ADMIN — AZITHROMYCIN SCH MLS/HR: 500 INJECTION, POWDER, LYOPHILIZED, FOR SOLUTION INTRAVENOUS at 10:47

## 2021-08-09 RX ADMIN — CEFTRIAXONE SODIUM SCH MLS/HR: 2 INJECTION, POWDER, FOR SOLUTION INTRAMUSCULAR; INTRAVENOUS at 10:48

## 2021-08-09 NOTE — PROGRESS NOTE
Assessment and Plan


Assessment and plan: 


--COVID-19 test is negative





-- Febrile illness;


Current Visit: Yes   Status: Acute   


Multifactorial, due to pneumonia, PUI


Possible Covid, patient already vaccinated


Antipyretics, blood cultures


Supportive care





--Right upper lobe pneumonia/community-acquired


Current Visit: Yes   Status: Acute


Empiric antibiotics, Rocephin and Zithromax


Blood cultures, sputum cultures


Supportive care


Consider aspiration pneumonia





--end-stage renal disease on HD


Current Visit: Yes   Status: Chronic


Nephrology consulted


Hemodialysis per schedule





--Mild hyponatremia;


Current Visit: Yes   Status: Acute 


 probably secondary to fluid overload


Hemodialysis per schedule





--obesity; BMI 36.6


Current Visit: Yes   Status: Acute 


Patient needs weight reduction





--DVT prophylaxis


Current Visit: Yes   Status: Acute 


Heparin renal dose





--Full CODE STATUS





We will closely monitor the patient and adjust management as needed


Plan of care reviewed with the patient and her nurse


Follow nephrology consultation and recommendations





Closely monitor the patient and adjust management as needed








Brief history;


80-year-old obese female patient with significant past medical history of 

hypertension end-stage renal disease on hemodialysis was admitted through 

emergency room with fever cough and initial work-up is consistent with right 

upper lobe pneumonia and fluid overload, patient was received stat hemodialysis 

with significant improvement, started on empiric antibiotics Rocephin and 

Zithromax, with mild improvement of symptoms, COVID-19 test is negative





8/8/2021; COVID-19 test is negative


Patient received hemodialysis yesterday with significant improvement of symptoms


Receiving antibiotics for right-sided pneumonia


We will get follow-up chest x-ray tomorrow morning





8/9/19 21


Patient feels slightly better, follow-up x-ray mild improvement of pneumonia


Continue current IV antibiotics, oxygen titrate O2 sats to more than 90%


Nephrology following, HD per schedule





Recheck chest x-ray tomorrow, and if continues to improve


Will DC tomorrow on oral antibiotics if stable














History


Interval history: 


I seen and examined the patient at the bedside


Patient's chart and medications reviewed


Patient feels slightly better still has some shortness of breath 


and productive cough


No hematemesis


Vital signs noted








Hospitalist Physical





- Constitutional


Vitals: 


                                        











Temp Pulse Resp BP Pulse Ox


 


 98.3 F   90   20   114/53   98 


 


 08/09/21 11:31  08/09/21 11:31  08/09/21 11:31  08/09/21 11:31  08/09/21 11:31











General appearance: Present: no acute distress, well-nourished, obese





- EENT


Eyes: Present: PERRL, EOM intact





- Neck


Neck: Present: supple, normal ROM





- Respiratory


Respiratory effort: normal


Respiratory: right: rhonchi, bilateral: diminished, negative: rales, wheezing





- Cardiovascular


Rhythm: regular


Heart Sounds: Present: S1 & S2





- Extremities


Extremities: no ischemia, pulses intact





- Abdominal


General gastrointestinal: soft, non-tender, non-distended, normal bowel sounds





- Integumentary


Integumentary: Present: clear, warm





- Psychiatric


Psychiatric: appropriate mood/affect, cooperative





- Neurologic


Neurologic: CNII-XII intact, moves all extremities





Results





- Labs


CBC & Chem 7: 


                                 08/08/21 05:33





                                 08/08/21 05:33


Labs: 


                             Laboratory Last Values











WBC  9.9 K/mm3 (4.5-11.0)   08/08/21  05:33    


 


RBC  3.39 M/mm3 (3.65-5.03)  L  08/08/21  05:33    


 


Hgb  9.9 gm/dl (10.1-14.3)  L  08/08/21  05:33    


 


Hct  30.0 % (30.3-42.9)  L  08/08/21  05:33    


 


MCV  89 fl (79-97)   08/08/21  05:33    


 


MCH  29 pg (28-32)   08/08/21  05:33    


 


MCHC  33 % (30-34)   08/08/21  05:33    


 


RDW  18.0 % (13.2-15.2)  H  08/08/21  05:33    


 


Plt Count  147 K/mm3 (140-440)   08/08/21  05:33    


 


Mono % (Auto)  Np   08/08/21  05:33    


 


Add Manual Diff  Complete   08/08/21  05:33    


 


Total Counted  100   08/08/21  05:33    


 


Seg Neuts % (Manual)  80.0 % (40.0-70.0)  H  08/08/21  05:33    


 


Lymphocytes % (Manual)  11.0 % (13.4-35.0)  L  08/08/21  05:33    


 


Monocytes % (Manual)  9.0 % (0.0-7.3)  H  08/08/21  05:33    


 


Nucleated RBC %  Not Reportable   08/08/21  05:33    


 


Seg Neutrophils # Man  7.9 K/mm3 (1.8-7.7)  H  08/08/21  05:33    


 


Band Neutrophils #  0.0 K/mm3  08/08/21  05:33    


 


Lymphocytes # (Manual)  1.1 K/mm3 (1.2-5.4)  L  08/08/21  05:33    


 


Abs React Lymphs (Man)  0.0 K/mm3  08/08/21  05:33    


 


Monocytes # (Manual)  0.9 K/mm3 (0.0-0.8)  H  08/08/21  05:33    


 


Eosinophils # (Manual)  0.0 K/mm3 (0.0-0.4)   08/08/21  05:33    


 


Basophils # (Manual)  0.0 K/mm3 (0.0-0.1)   08/08/21  05:33    


 


Metamyelocytes #  0.0 K/mm3  08/08/21  05:33    


 


Myelocytes #  0.0 K/mm3  08/08/21  05:33    


 


Promyelocytes #  0.0 K/mm3  08/08/21  05:33    


 


Blast Cells #  0.0 K/mm3  08/08/21  05:33    


 


WBC Morphology  Not Reportable   08/08/21  05:33    


 


Hypersegmented Neuts  Not Reportable   08/08/21  05:33    


 


Hyposegmented Neuts  Not Reportable   08/08/21  05:33    


 


Hypogranular Neuts  Not Reportable   08/08/21  05:33    


 


Smudge Cells  Not Reportable   08/08/21  05:33    


 


Toxic Granulation  Not Reportable   08/08/21  05:33    


 


Toxic Vacuolation  Not Reportable   08/08/21  05:33    


 


Dohle Bodies  Not Reportable   08/08/21  05:33    


 


Pelger-Huet Anomaly  Not Reportable   08/08/21  05:33    


 


Kindra Rods  Not Reportable   08/08/21  05:33    


 


Platelet Estimate  Consistent w auto   08/08/21  05:33    


 


Clumped Platelets  Not Reportable   08/08/21  05:33    


 


Plt Clumps, EDTA  Not Reportable   08/08/21  05:33    


 


Large Platelets  Not Reportable   08/08/21  05:33    


 


Giant Platelets  Not Reportable   08/08/21  05:33    


 


Platelet Satelliting  Not Reportable   08/08/21  05:33    


 


Plt Morphology Comment  Not Reportable   08/08/21  05:33    


 


RBC Morphology  Not Reportable   08/08/21  05:33    


 


Dimorphic RBCs  Not Reportable   08/08/21  05:33    


 


Polychromasia  Not Reportable   08/08/21  05:33    


 


Hypochromasia  Not Reportable   08/08/21  05:33    


 


Poikilocytosis  Not Reportable   08/08/21  05:33    


 


Anisocytosis  1+   08/08/21  05:33    


 


Microcytosis  Not Reportable   08/08/21  05:33    


 


Macrocytosis  Not Reportable   08/08/21  05:33    


 


Spherocytes  Not Reportable   08/08/21  05:33    


 


Pappenheimer Bodies  Not Reportable   08/08/21  05:33    


 


Sickle Cells  Not Reportable   08/08/21  05:33    


 


Target Cells  Not Reportable   08/08/21  05:33    


 


Tear Drop Cells  Not Reportable   08/08/21  05:33    


 


Ovalocytes  Not Reportable   08/08/21  05:33    


 


Helmet Cells  Not Reportable   08/08/21  05:33    


 


Velaz[place] Bodies  Not Reportable   08/08/21  05:33    


 


Cabot Rings  Not Reportable   08/08/21  05:33    


 


[place] Cells  Not Reportable   08/08/21  05:33    


 


Bite Cells  Not Reportable   08/08/21  05:33    


 


Crenated Cell  Not Reportable   08/08/21  05:33    


 


Elliptocytes  Not Reportable   08/08/21  05:33    


 


Acanthocytes (Spur)  Not Reportable   08/08/21  05:33    


 


Rouleaux  Not Reportable   08/08/21  05:33    


 


Hemoglobin C Crystals  Not Reportable   08/08/21  05:33    


 


Schistocytes  Not Reportable   08/08/21  05:33    


 


Malaria parasites  Not Reportable   08/08/21  05:33    


 


Lawson Bodies  Not Reportable   08/08/21  05:33    


 


Hem Pathologist Commnt  No   08/08/21  05:33    


 


PT  16.0 Sec. (12.2-14.9)  H  08/06/21  21:40    


 


INR  1.23  (0.87-1.13)  H  08/06/21  21:40    


 


VBG pH  7.474  (7.320-7.420)  H  08/06/21  21:40    


 


Sodium  136 mmol/L (137-145)  L  08/08/21  05:33    


 


Potassium  4.5 mmol/L (3.6-5.0)   08/08/21  05:33    


 


Chloride  92.4 mmol/L ()  L  08/08/21  05:33    


 


Carbon Dioxide  30 mmol/L (22-30)   08/08/21  05:33    


 


Anion Gap  18 mmol/L  08/08/21  05:33    


 


BUN  42 mg/dL (7-17)  H  08/08/21  05:33    


 


Creatinine  8.6 mg/dL (0.6-1.2)  H  08/08/21  05:33    


 


Estimated GFR  5 ml/min  08/08/21  05:33    


 


BUN/Creatinine Ratio  5 %  08/08/21  05:33    


 


Glucose  174 mg/dL ()  H  08/08/21  05:33    


 


POC Glucose  193 mg/dL ()  H  08/09/21  11:34    


 


Lactic Acid  1.70 mmol/L (0.7-2.0)   08/07/21  01:01    


 


Calcium  9.4 mg/dL (8.4-10.2)   08/08/21  05:33    


 


Total Bilirubin  0.80 mg/dL (0.1-1.2)   08/06/21  21:40    


 


AST  33 units/L (5-40)   08/06/21  21:40    


 


ALT  14 units/L (7-56)   08/06/21  21:40    


 


Alkaline Phosphatase  132 units/L ()  H  08/06/21  21:40    


 


Total Protein  6.7 g/dL (6.3-8.2)   08/06/21  21:40    


 


Albumin  3.8 g/dL (3.9-5)  L  08/06/21  21:40    


 


Albumin/Globulin Ratio  1.3 %  08/06/21  21:40    


 


Coronavirus (PCR)  Negative  (Negative)   08/07/21  10:30    


 


Hepatitis A IgM Ab  Non-reactive  (NonReactive)   08/07/21  12:18    


 


Hep Bs Antigen  Nonreactive  (Negative)   08/07/21  12:18    


 


Hep B Core IgM Ab  Non-reactive  (NonReactive)   08/07/21  12:18    


 


Hepatitis C Antibody  Non-reactive  (NonReactive)   08/07/21  12:18    











Microbiology: 


Microbiology





08/06/21 21:40   Peripheral/Venous   Blood Culture - Preliminary


                            NO GROWTH AFTER 48 HOURS


08/06/21 21:40   Peripheral/Venous   Blood Culture - Preliminary


                            NO GROWTH AFTER 48 HOURS








Herrera/IV: 


                                        





Voiding Method                   Bedside Commode











Active Medications





- Current Medications


Current Medications: 














Generic Name Dose Route Start Last Admin





  Trade Name Freq  PRN Reason Stop Dose Admin


 


Acetaminophen  650 mg  08/07/21 18:04  08/07/21 21:55





  Acetaminophen 325 Mg Tab  PO   650 mg





  Q6H PRN   Administration





  Pain, Mild (1-3)  


 


Azithromycin  500 mg  08/10/21 10:00 





  Azithromycin 250 Mg Tab  PO  08/11/21 10:01 





  QDAY BEATRIS  





  Protocol  


 


Famotidine  20 mg  08/08/21 10:00  08/09/21 09:30





  Famotidine 20 Mg Tab  PO   20 mg





  QDAY BEATRIS   Administration


 


Ceftriaxone Sodium  2 gm in 100 mls @ 200 mls/hr  08/07/21 10:00  08/09/21 10:48





  Rocephin/Ns 2 Gm/100 Ml  IV  08/11/21 11:59  200 mls/hr





  Q24H BEATRIS   Administration





  Protocol  


 


Sodium Chloride  100 mls @ 999 mls/hr  08/09/21 11:00 





  Nacl 0.9%  IV  





  MICHELLE PRN  





  Hypotension  


 


Tramadol HCl  50 mg  08/07/21 19:10 





  Tramadol 50 Mg Tab  PO  





  Q6H PRN  





  Pain, Moderate (4-6)

## 2021-08-09 NOTE — PROGRESS NOTE
Assessment and Plan





- Patient Problems


(1) End-stage renal disease on hemodialysis


Current Visit: Yes   Status: Acute   


Plan to address problem: 


Cont HD on MWF schedule 








(2) Community acquired pneumonia


Current Visit: Yes   Status: Acute   


Plan to address problem: 


cont ABX treatment cetriaxone 








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 

end stage renal disease


Current Visit: Yes   Status: Acute   


Plan to address problem: 


monitor BP off meds 








(4) Anemia in chronic illness


Current Visit: Yes   Status: Acute   


Plan to address problem: 


Hb at target. no further EPO needed. 








Subjective


Date of service: 08/09/21


Principal diagnosis: ESRD


Interval history: 





Pt awake, alert, in no acute distress 





Objective





- Vital Signs


Vital signs: 


                               Vital Signs - 12hr











  08/09/21 08/09/21 08/09/21





  04:15 08:57 08:59


 


Temperature 98.5 F 98.0 F 


 


Pulse Rate 104 H 95 H 


 


Respiratory 18 20 





Rate   


 


Blood Pressure 103/53 184/63 124/57


 


O2 Sat by Pulse 95 92 





Oximetry   














  08/09/21





  11:31


 


Temperature 98.3 F


 


Pulse Rate 90


 


Respiratory 20





Rate 


 


Blood Pressure 114/53


 


O2 Sat by Pulse 98





Oximetry 














- General Appearance


General appearance: well-developed, well-nourished, appears stated age


EENT: ATNC, PERRL, mucous membranes moist


Neck: no JVD


Respiratory: Present: Clear to Ascultation


Cardiology: regular, S1S2


Gastrointestinal: normoactive bowel sounds


Integumentary: no rash, other (no edema )


Neurologic: no focal deficit, alert and oriented x3, strength 5/5, CN 3-12 

intact


Psychiatric: mood/affect appropriate, cooperative





- Lab





                                 08/08/21 05:33





                                 08/08/21 05:33


                             Most recent lab results











Calcium  9.4 mg/dL (8.4-10.2)   08/08/21  05:33    














Medications & Allergies





- Medications


Allergies/Adverse Reactions: 


                                    Allergies





No Known Allergies Allergy (Unverified 05/08/21 08:02)


   








Home Medications: 


                                Home Medications











 Medication  Instructions  Recorded  Confirmed  Last Taken  Type


 


traMADoL [Ultram 50 MG tab] 50 mg PO Q6HR PRN #15 tablet 05/08/21  Unknown Rx











Active Medications: 














Generic Name Dose Route Start Last Admin





  Trade Name Freq  PRN Reason Stop Dose Admin


 


Acetaminophen  650 mg  08/07/21 18:04  08/07/21 21:55





  Acetaminophen 325 Mg Tab  PO   650 mg





  Q6H PRN   Administration





  Pain, Mild (1-3)  


 


Azithromycin  500 mg  08/10/21 10:00 





  Azithromycin 250 Mg Tab  PO  08/11/21 10:01 





  QDAY BEATRIS  





  Protocol  


 


Famotidine  20 mg  08/08/21 10:00  08/09/21 09:30





  Famotidine 20 Mg Tab  PO   20 mg





  QDAY BEATRIS   Administration


 


Ceftriaxone Sodium  2 gm in 100 mls @ 200 mls/hr  08/07/21 10:00  08/09/21 10:48





  Rocephin/Ns 2 Gm/100 Ml  IV  08/11/21 11:59  200 mls/hr





  Q24H BEATRIS   Administration





  Protocol  


 


Sodium Chloride  100 mls @ 999 mls/hr  08/09/21 11:00 





  Nacl 0.9%  IV  





  MICHELLE PRN  





  Hypotension  


 


Tramadol HCl  50 mg  08/07/21 19:10 





  Tramadol 50 Mg Tab  PO  





  Q6H PRN  





  Pain, Moderate (4-6)

## 2021-08-09 NOTE — XRAY REPORT
XR chest 1V ap



INDICATION / CLINICAL INFORMATION:

Follow-up right pneumonia. 



COMPARISON: 

8/6/2021



FINDINGS:



SUPPORT DEVICES: None



HEART /PULMONARY VASCULATURE: Unchanged. 



LUNGS / PLEURA: Mild improvement in right upper lung airspace consolidation. No new or increasing air
space consolidation. No pleural effusion. No pneumothorax.



IMPRESSION:

Mild improvement in right upper lung pneumonia.



Signer Name: Ramiro Jara MD 

Signed: 8/9/2021 9:44 AM

Workstation Name: [id number]

## 2021-08-10 PROCEDURE — 5A1D70Z PERFORMANCE OF URINARY FILTRATION, INTERMITTENT, LESS THAN 6 HOURS PER DAY: ICD-10-PCS | Performed by: INTERNAL MEDICINE

## 2021-08-10 RX ADMIN — CEFTRIAXONE SODIUM SCH MLS/HR: 2 INJECTION, POWDER, FOR SOLUTION INTRAMUSCULAR; INTRAVENOUS at 09:34

## 2021-08-10 RX ADMIN — AZITHROMYCIN SCH MG: 250 TABLET, FILM COATED ORAL at 09:34

## 2021-08-10 RX ADMIN — FAMOTIDINE SCH MG: 20 TABLET ORAL at 09:34

## 2021-08-10 NOTE — PROGRESS NOTE
Assessment and Plan


Assessment and plan: 


--COVID-19 test is negative





-- Febrile illness;


Current Visit: Yes   Status: Acute   


Multifactorial, due to pneumonia, PUI


Possible Covid, patient already vaccinated


Antipyretics, blood cultures


Supportive care





--Right upper lobe pneumonia/community-acquired


Current Visit: Yes   Status: Acute


Empiric antibiotics, Rocephin and Zithromax


Blood cultures, sputum cultures


Follow-up chest x-ray tomorrow





--end-stage renal disease on HD


Current Visit: Yes   Status: Chronic


Nephrology consulted


Hemodialysis per schedule





--Mild hyponatremia; improved


Current Visit: Yes   Status: Acute 


 probably secondary to fluid overload


Hemodialysis per schedule





--obesity; BMI 36.6


Current Visit: Yes   Status: Acute 


Patient needs weight reduction





--DVT prophylaxis


Current Visit: Yes   Status: Acute 


Heparin renal dose





--Full CODE STATUS





We will closely monitor the patient and adjust management as needed


Plan of care reviewed with the patient and her nurse


Follow nephrology consultation and recommendations





Closely monitor the patient and adjust management as needed








Brief history;


80-year-old obese female patient with significant past medical history of hyper

tension end-stage renal disease on hemodialysis was admitted through emergency 

room with fever cough and initial work-up is consistent with right upper lobe 

pneumonia and fluid overload, patient was received stat hemodialysis with 

significant improvement, started on empiric antibiotics Rocephin and Zithromax, 

with mild improvement of symptoms, COVID-19 test is negative





8/8/2021; COVID-19 test is negative


Patient received hemodialysis yesterday with significant improvement of symptoms


Receiving antibiotics for right-sided pneumonia


We will get follow-up chest x-ray tomorrow morning





8/9/19 21


Patient feels slightly better, follow-up x-ray mild improvement of pneumonia


Continue current IV antibiotics, oxygen titrate O2 sats to more than 90%


Nephrology following, HD per schedule





8/10/2021


Patient feels slightly better day 4 of antibiotics


Hemodialysis per schedule, will complete antibiotic course tomorrow


And if stable will discharge home


Follow-up chest x-ray tomorrow if stable will discharge home





History


Interval history: 


I have seen and examined the patient at the bedside


Patient's chart and medications reviewed


No new events reported by the nursing


Follow-up chest x-ray mild improvement


Mild shortness of breath and cough


Vital signs reviewed








Hospitalist Physical





- Constitutional


Vitals: 


                                        











Temp Pulse Resp BP Pulse Ox


 


 97.9 F   92 H  20   88/49   92 


 


 08/10/21 07:46  08/10/21 07:46  08/10/21 07:46  08/10/21 07:46  08/10/21 07:46











General appearance: Present: no acute distress, well-nourished, obese





- EENT


Eyes: Present: PERRL, EOM intact





- Neck


Neck: Present: supple, normal ROM





- Respiratory


Respiratory effort: normal


Respiratory: bilateral: diminished, negative: rales, rhonchi, wheezing





- Cardiovascular


Rhythm: regular


Heart Sounds: Present: S1 & S2





- Extremities


Extremities: no ischemia, No edema





- Abdominal


General gastrointestinal: soft, non-tender, non-distended, normal bowel sounds





- Integumentary


Integumentary: Present: clear, warm





- Psychiatric


Psychiatric: appropriate mood/affect, cooperative





- Neurologic


Neurologic: CNII-XII intact, moves all extremities





Results





- Labs


CBC & Chem 7: 


                                 08/08/21 05:33





                                 08/08/21 05:33


Labs: 


                             Laboratory Last Values











WBC  9.9 K/mm3 (4.5-11.0)   08/08/21  05:33    


 


RBC  3.39 M/mm3 (3.65-5.03)  L  08/08/21  05:33    


 


Hgb  9.9 gm/dl (10.1-14.3)  L  08/08/21  05:33    


 


Hct  30.0 % (30.3-42.9)  L  08/08/21  05:33    


 


MCV  89 fl (79-97)   08/08/21  05:33    


 


MCH  29 pg (28-32)   08/08/21  05:33    


 


MCHC  33 % (30-34)   08/08/21  05:33    


 


RDW  18.0 % (13.2-15.2)  H  08/08/21  05:33    


 


Plt Count  147 K/mm3 (140-440)   08/08/21  05:33    


 


Mono % (Auto)  Np   08/08/21  05:33    


 


Add Manual Diff  Complete   08/08/21  05:33    


 


Total Counted  100   08/08/21  05:33    


 


Seg Neuts % (Manual)  80.0 % (40.0-70.0)  H  08/08/21  05:33    


 


Lymphocytes % (Manual)  11.0 % (13.4-35.0)  L  08/08/21  05:33    


 


Monocytes % (Manual)  9.0 % (0.0-7.3)  H  08/08/21  05:33    


 


Nucleated RBC %  Not Reportable   08/08/21  05:33    


 


Seg Neutrophils # Man  7.9 K/mm3 (1.8-7.7)  H  08/08/21  05:33    


 


Band Neutrophils #  0.0 K/mm3  08/08/21  05:33    


 


Lymphocytes # (Manual)  1.1 K/mm3 (1.2-5.4)  L  08/08/21  05:33    


 


Abs React Lymphs (Man)  0.0 K/mm3  08/08/21  05:33    


 


Monocytes # (Manual)  0.9 K/mm3 (0.0-0.8)  H  08/08/21  05:33    


 


Eosinophils # (Manual)  0.0 K/mm3 (0.0-0.4)   08/08/21  05:33    


 


Basophils # (Manual)  0.0 K/mm3 (0.0-0.1)   08/08/21  05:33    


 


Metamyelocytes #  0.0 K/mm3  08/08/21  05:33    


 


Myelocytes #  0.0 K/mm3  08/08/21  05:33    


 


Promyelocytes #  0.0 K/mm3  08/08/21  05:33    


 


Blast Cells #  0.0 K/mm3  08/08/21  05:33    


 


WBC Morphology  Not Reportable   08/08/21  05:33    


 


Hypersegmented Neuts  Not Reportable   08/08/21  05:33    


 


Hyposegmented Neuts  Not Reportable   08/08/21  05:33    


 


Hypogranular Neuts  Not Reportable   08/08/21  05:33    


 


Smudge Cells  Not Reportable   08/08/21  05:33    


 


Toxic Granulation  Not Reportable   08/08/21  05:33    


 


Toxic Vacuolation  Not Reportable   08/08/21  05:33    


 


Dohle Bodies  Not Reportable   08/08/21  05:33    


 


Pelger-Huet Anomaly  Not Reportable   08/08/21  05:33    


 


Kindra Rods  Not Reportable   08/08/21  05:33    


 


Platelet Estimate  Consistent w auto   08/08/21  05:33    


 


Clumped Platelets  Not Reportable   08/08/21  05:33    


 


Plt Clumps, EDTA  Not Reportable   08/08/21  05:33    


 


Large Platelets  Not Reportable   08/08/21  05:33    


 


Giant Platelets  Not Reportable   08/08/21  05:33    


 


Platelet Satelliting  Not Reportable   08/08/21  05:33    


 


Plt Morphology Comment  Not Reportable   08/08/21  05:33    


 


RBC Morphology  Not Reportable   08/08/21  05:33    


 


Dimorphic RBCs  Not Reportable   08/08/21  05:33    


 


Polychromasia  Not Reportable   08/08/21  05:33    


 


Hypochromasia  Not Reportable   08/08/21  05:33    


 


Poikilocytosis  Not Reportable   08/08/21  05:33    


 


Anisocytosis  1+   08/08/21  05:33    


 


Microcytosis  Not Reportable   08/08/21  05:33    


 


Macrocytosis  Not Reportable   08/08/21  05:33    


 


Spherocytes  Not Reportable   08/08/21  05:33    


 


Pappenheimer Bodies  Not Reportable   08/08/21  05:33    


 


Sickle Cells  Not Reportable   08/08/21  05:33    


 


Target Cells  Not Reportable   08/08/21  05:33    


 


Tear Drop Cells  Not Reportable   08/08/21  05:33    


 


Ovalocytes  Not Reportable   08/08/21  05:33    


 


Helmet Cells  Not Reportable   08/08/21  05:33    


 


Velazquez-Dundee Bodies  Not Reportable   08/08/21  05:33    


 


Cabot Rings  Not Reportable   08/08/21  05:33    


 


Anni Cells  Not Reportable   08/08/21  05:33    


 


Bite Cells  Not Reportable   08/08/21  05:33    


 


Crenated Cell  Not Reportable   08/08/21  05:33    


 


Elliptocytes  Not Reportable   08/08/21  05:33    


 


Acanthocytes (Spur)  Not Reportable   08/08/21  05:33    


 


Rouleaux  Not Reportable   08/08/21  05:33    


 


Hemoglobin C Crystals  Not Reportable   08/08/21  05:33    


 


Schistocytes  Not Reportable   08/08/21  05:33    


 


Malaria parasites  Not Reportable   08/08/21  05:33    


 


Lawson Bodies  Not Reportable   08/08/21  05:33    


 


Hem Pathologist Commnt  No   08/08/21  05:33    


 


PT  16.0 Sec. (12.2-14.9)  H  08/06/21  21:40    


 


INR  1.23  (0.87-1.13)  H  08/06/21  21:40    


 


VBG pH  7.474  (7.320-7.420)  H  08/06/21  21:40    


 


Sodium  136 mmol/L (137-145)  L  08/08/21  05:33    


 


Potassium  4.5 mmol/L (3.6-5.0)   08/08/21  05:33    


 


Chloride  92.4 mmol/L ()  L  08/08/21  05:33    


 


Carbon Dioxide  30 mmol/L (22-30)   08/08/21  05:33    


 


Anion Gap  18 mmol/L  08/08/21  05:33    


 


BUN  42 mg/dL (7-17)  H  08/08/21  05:33    


 


Creatinine  8.6 mg/dL (0.6-1.2)  H  08/08/21  05:33    


 


Estimated GFR  5 ml/min  08/08/21  05:33    


 


BUN/Creatinine Ratio  5 %  08/08/21  05:33    


 


Glucose  174 mg/dL ()  H  08/08/21  05:33    


 


POC Glucose  152 mg/dL ()  H  08/09/21  16:52    


 


Lactic Acid  1.70 mmol/L (0.7-2.0)   08/07/21  01:01    


 


Calcium  9.4 mg/dL (8.4-10.2)   08/08/21  05:33    


 


Total Bilirubin  0.80 mg/dL (0.1-1.2)   08/06/21  21:40    


 


AST  33 units/L (5-40)   08/06/21  21:40    


 


ALT  14 units/L (7-56)   08/06/21  21:40    


 


Alkaline Phosphatase  132 units/L ()  H  08/06/21  21:40    


 


Total Protein  6.7 g/dL (6.3-8.2)   08/06/21  21:40    


 


Albumin  3.8 g/dL (3.9-5)  L  08/06/21  21:40    


 


Albumin/Globulin Ratio  1.3 %  08/06/21  21:40    


 


Coronavirus (PCR)  Negative  (Negative)   08/07/21  10:30    


 


Hepatitis A IgM Ab  Non-reactive  (NonReactive)   08/07/21  12:18    


 


Hep Bs Antigen  Nonreactive  (Negative)   08/07/21  12:18    


 


Hep B Core IgM Ab  Non-reactive  (NonReactive)   08/07/21  12:18    


 


Hepatitis C Antibody  Non-reactive  (NonReactive)   08/07/21  12:18    











Microbiology: 


Microbiology





08/06/21 21:40   Peripheral/Venous   Blood Culture - Preliminary


                            NO GROWTH AFTER 72 HOURS


08/06/21 21:40   Peripheral/Venous   Blood Culture - Preliminary


                            NO GROWTH AFTER 72 HOURS








Herrera/IV: 


                                        





Voiding Method                   Toilet











Active Medications





- Current Medications


Current Medications: 














Generic Name Dose Route Start Last Admin





  Trade Name Freq  PRN Reason Stop Dose Admin


 


Acetaminophen  650 mg  08/07/21 18:04  08/07/21 21:55





  Acetaminophen 325 Mg Tab  PO   650 mg





  Q6H PRN   Administration





  Pain, Mild (1-3)  


 


Azithromycin  500 mg  08/10/21 10:00  08/10/21 09:34





  Azithromycin 250 Mg Tab  PO  08/11/21 10:01  500 mg





  QDAY BEATRIS   Administration





  Protocol  


 


Famotidine  20 mg  08/08/21 10:00  08/10/21 09:34





  Famotidine 20 Mg Tab  PO   20 mg





  QDAY BEATRIS   Administration


 


Ceftriaxone Sodium  2 gm in 100 mls @ 200 mls/hr  08/07/21 10:00  08/10/21 09:34





  Rocephin/Ns 2 Gm/100 Ml  IV  08/11/21 11:59  200 mls/hr





  Q24H BEATRIS   Administration





  Protocol  


 


Sodium Chloride  100 mls @ 999 mls/hr  08/09/21 11:00 





  Nacl 0.9%  IV  





  MICHELLE PRN  





  Hypotension  


 


Tramadol HCl  50 mg  08/07/21 19:10  08/09/21 22:11





  Tramadol 50 Mg Tab  PO   50 mg





  Q6H PRN   Administration





  Pain, Moderate (4-6)

## 2021-08-11 VITALS — DIASTOLIC BLOOD PRESSURE: 68 MMHG | SYSTOLIC BLOOD PRESSURE: 110 MMHG

## 2021-08-11 RX ADMIN — AZITHROMYCIN SCH MG: 250 TABLET, FILM COATED ORAL at 09:50

## 2021-08-11 RX ADMIN — CEFTRIAXONE SODIUM SCH MLS/HR: 2 INJECTION, POWDER, FOR SOLUTION INTRAMUSCULAR; INTRAVENOUS at 09:50

## 2021-08-11 RX ADMIN — FAMOTIDINE SCH MG: 20 TABLET ORAL at 09:50

## 2021-08-11 NOTE — DISCHARGE SUMMARY
Providers





- Providers


Date of Admission: 


08/07/21 09:08





Date of discharge: 08/11/21


Attending physician: 


AMY J KOCHERLA





                                        





08/07/21 09:30


Consult to Physician [CONS] Stat 


   Comment: 


   Consulting Provider: STEVE IRBY


   Physician Instructions: 


   Reason For Exam: ESRD





08/07/21 19:21


Occupational Therapy Evaluate and Treat [CONS] Routine 


   Comment: 


   Reason For Exam: Recurrent falls/evaluate and treat/DC needs


Physical Therapy Evaluation and Treat [CONS] Routine 


   Comment: 


   Reason For Exam: Recurrent falls/evaluate and treat/DC needs











Primary care physician: 


PRIMARY CARE MD








Hospitalization


Reason for admission: Worsening shortness of breath/fever and cough/missed 

dialysis


Condition: Fair


Pertinent studies: 


Multiple chest x-ray





Procedures: 


Hemodialysis per schedule





Hospital course: 


81 yo female patient with hx of ESRD on HD MWF, hypertension  presents to the 

emergency room with fever cough brown-yellow mucus production.  Patient also 

gives history of recurrent falls , She has fever, chills, sore throat, headache,

 poor appetite diarrhea.  She received 2 doses of the Covid vaccine in March and

 April. ,  Patient missed her dialysis yesterday as she was not feeling well.


Initial work-up in the emergency room is consistent with, right upper lobe 

pneumonia on chest x-ray, mild leukocytosis, mild hyponatremia probably 

secondary to fluid overload.  Patient was admitted: Test was negative, treated 

with empiric antibiotics for her community-acquired pneumonia, evaluated by 

nephrology


Received hemodialysis per schedule, obtain serial x-rays, which showed improving

 pneumonia


Today patient is comfortable no new complaints, denies shortness of breath or 

cough, vital signs reviewed


Physical examination prior to discharge did not show any new changes


Cleared by nephrology for discharge and follow-up hemodialysis per schedule


PT OT evaluated the patient, recommended inpatient acute rehab, however patient 

refused, and case management set up


Home health PT, home health OT, home health nurse.


I discussed with patient's daughter Ms. Li in detail her condition, 

discharge planning and recommendations of acute rehab


She opted home PT home OT and home health services.


Patient is stable at discharge











Discharge diagnosis:


--COVID-19 test is negative





-- Febrile illness;


Current Visit: Yes   Status: Acute   


Multifactorial, due to pneumonia, PUI


Possible Covid, patient already vaccinated


Antipyretics, blood cultures


Supportive care





--Right upper lobe pneumonia/community-acquired


Current Visit: Yes   Status: Acute


Empiric antibiotics, Rocephin and Zithromax


Blood cultures, sputum cultures


Follow-up chest x-ray tomorrow


Not an aspiration pneumonia





--end-stage renal disease on HD


Current Visit: Yes   Status: Chronic


Nephrology consulted


Hemodialysis per schedule





--Mild hyponatremia; improved


Current Visit: Yes   Status: Acute 


 probably secondary to fluid overload


Hemodialysis per schedule





--obesity; BMI 36.6


Current Visit: Yes   Status: Acute 


Patient needs weight reduction





--DVT prophylaxis


Current Visit: Yes   Status: Acute 


Heparin renal dose





--Full CODE STATUS











Patient is stable at discharge


Disposition: DC/TX-06 HOME UNDER HOME HL


Final Discharge Diagnosis (Prints w/discharge instructions): Covid test is 

negative.  Febrile illness due to pneumonia.  Right upper lobe community-

acquired pneumonia.  End-stage renal disease.  On hemodialysis.  Hyponatremia 

improved.  Obesity BMI 36.6


Time spent for discharge: 30 min





Core Measure Documentation





- Palliative Care


Palliative Care/ Comfort Measures: Not Applicable





- Core Measures


Any of the following diagnoses?: none





Exam





- Constitutional


Vitals: 


                                        











Temp Pulse Resp BP Pulse Ox


 


 98.6 F   84   18   110/68   94 


 


 08/11/21 05:00  08/11/21 05:00  08/10/21 23:14  08/11/21 05:00  08/11/21 09:00











General appearance: Present: no acute distress, well-nourished





- EENT


Eyes: Present: PERRL, EOM intact





- Neck


Neck: Present: supple, normal ROM





- Respiratory


Respiratory effort: normal


Respiratory: bilateral: diminished, negative: rales, rhonchi, wheezing





- Cardiovascular


Rhythm: regular


Heart Sounds: Present: S1 & S2





- Extremities


Extremities: no ischemia, No edema





- Abdominal


General gastrointestinal: Present: soft, non-tender, non-distended, normal bowel

 sounds





- Integumentary


Integumentary: Present: clear, warm





- Musculoskeletal


Musculoskeletal: strength equal bilaterally, generalized weakness





- Psychiatric


Psychiatric: appropriate mood/affect, cooperative





- Neurologic


Neurologic: moves all extremities





Plan


Activity: advance as tolerated, fall precautions


Diet: renal


Special Instructions: physical therapy (Home health physical therapy), 

occupational therapy (Home health occupational)


Additional Instructions: Follow renal/hemodialysis per schedule, Fall 

precautions.  If you have worsening symptoms, contact MD or go to the nearest 

ER.  Home health nurse, home PT and home OT is set up by the case management


Follow up with: 


STEVE IRBY MD [Staff Physician] - 7 Days


PRIMARY CARE,MD [Primary Care Provider] - 3-5 Days


Prescriptions: 


Famotidine [Pepcid] 20 mg PO QDAY #30 tablet

## 2021-08-11 NOTE — XRAY REPORT
CHEST 1 VIEW 



INDICATION:  f/u rt pneumonia.



COMPARISON:  8/9/2021



FINDINGS:

Support devices: None. 



Heart: Within normal limits. 

Lungs/Pleura: Right upper lobe infiltrate has decreased by 25%. The remainder of the lungs are clear.
 No pleural effusion or pneumothorax. 



Additional findings: None.



IMPRESSION:

 Further improvement in the right upper lobe infiltrate.



Signer Name: Dirk Butterfield Jr, MD 

Signed: 8/11/2021 8:08 AM

Workstation Name: [id number]

## 2021-08-11 NOTE — PROGRESS NOTE
Assessment and Plan





- Patient Problems


(1) End-stage renal disease on hemodialysis


Current Visit: Yes   Status: Acute   


Plan to address problem: 


Received back to back HD on Mon & Tue, resume HD on MWF schedule starting Friday










(2) Community acquired pneumonia


Current Visit: Yes   Status: Acute   


Plan to address problem: 


cont ABX treatment cetriaxone 








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 

end stage renal disease


Current Visit: Yes   Status: Acute   


Plan to address problem: 


monitor BP off meds 








(4) Anemia in chronic illness


Current Visit: Yes   Status: Acute   


Plan to address problem: 


Hb at target. no further EPO needed. 








Subjective


Date of service: 08/11/21


Principal diagnosis: ESRD


Interval history: 





Pt awake, alert, in no acute distress. Received back to back HD on Mon/Tue. 





Objective





- Vital Signs


Vital signs: 


                               Vital Signs - 12hr











  08/10/21 08/11/21 08/11/21





  23:14 05:00 09:00


 


Temperature 98.0 F 98.6 F 


 


Pulse Rate 91 H 84 


 


Respiratory 18  





Rate   


 


Blood Pressure 130/61  


 


Blood Pressure  110/68 





[Right]   


 


O2 Sat by Pulse 97  94





Oximetry   














- General Appearance


General appearance: well-developed, well-nourished, appears stated age


EENT: ATNC, PERRL, mucous membranes moist


Neck: no JVD


Respiratory: Present: Clear to Ascultation


Cardiology: S1S2


Gastrointestinal: normoactive bowel sounds


Integumentary: no rash


Neurologic: no focal deficit, alert and oriented x3, strength 5/5, CN 3-12 

intact


Psychiatric: mood/affect appropriate, cooperative





- Lab





                                 08/08/21 05:33





                                 08/08/21 05:33


                             Most recent lab results











Calcium  9.4 mg/dL (8.4-10.2)   08/08/21  05:33    














Medications & Allergies





- Medications


Allergies/Adverse Reactions: 


                                    Allergies





No Known Allergies Allergy (Unverified 05/08/21 08:02)


   








Home Medications: 


                                Home Medications











 Medication  Instructions  Recorded  Confirmed  Last Taken  Type


 


traMADoL [Ultram 50 MG tab] 50 mg PO Q6HR PRN #15 tablet 05/08/21  Unknown Rx











Active Medications: 














Generic Name Dose Route Start Last Admin





  Trade Name Freq  PRN Reason Stop Dose Admin


 


Acetaminophen  650 mg  08/07/21 18:04  08/07/21 21:55





  Acetaminophen 325 Mg Tab  PO   650 mg





  Q6H PRN   Administration





  Pain, Mild (1-3)  


 


Famotidine  20 mg  08/08/21 10:00  08/11/21 09:50





  Famotidine 20 Mg Tab  PO   20 mg





  QDAY BEATRIS   Administration


 


Ceftriaxone Sodium  2 gm in 100 mls @ 200 mls/hr  08/07/21 10:00  08/11/21 09:50





  Rocephin/Ns 2 Gm/100 Ml  IV  08/11/21 11:59  200 mls/hr





  Q24H BEATRIS   Administration





  Protocol  


 


Sodium Chloride  100 mls @ 999 mls/hr  08/09/21 11:00 





  Nacl 0.9%  IV  





  MICHELLE PRN  





  Hypotension  


 


Tramadol HCl  50 mg  08/07/21 19:10  08/09/21 22:11





  Tramadol 50 Mg Tab  PO   50 mg





  Q6H PRN   Administration





  Pain, Moderate (4-6)

## 2021-08-11 NOTE — DISCHARGE SUMMARY
Providers





- Providers


Date of Admission: 


08/07/21 09:08





Date of discharge: 08/11/21


Attending physician: 


AMY J KOCHERLA





                                        





08/07/21 09:30


Consult to Physician [CONS] Stat 


   Comment: 


   Consulting Provider: STEVE IRBY


   Physician Instructions: 


   Reason For Exam: ESRD





08/07/21 19:21


Occupational Therapy Evaluate and Treat [CONS] Routine 


   Comment: 


   Reason For Exam: Recurrent falls/evaluate and treat/DC needs


Physical Therapy Evaluation and Treat [CONS] Routine 


   Comment: 


   Reason For Exam: Recurrent falls/evaluate and treat/DC needs











Primary care physician: 


PRIMARY CARE MD








Hospitalization


Condition: Fair


Disposition: DC/TX-06 HOME UNDER HOME HLTH


Time spent for discharge: 35 min





Exam





- Constitutional


Vitals: 


                                        











Temp Pulse Resp BP Pulse Ox


 


 98.6 F   84   18   110/68   94 


 


 08/11/21 05:00  08/11/21 05:00  08/10/21 23:14  08/11/21 05:00  08/11/21 09:00














Plan


Activity: advance as tolerated, fall precautions


Diet: renal


Additional Instructions: Follow renal/hemodialysis per schedule, Fall 

precautions.  If you have worsening symptoms, contact MD or go to the nearest 

ER.  Home health nurse, home PT and home OT is set up by the case management


Follow up with: 


PRIMARY CARE,MD [Primary Care Provider] - 3-5 Days


STEVE IRBY MD [Staff Physician] - 7 Days


Prescriptions: 


Famotidine [Pepcid] 20 mg PO QDAY #30 tablet

## 2022-04-30 ENCOUNTER — TELEPHONE ENCOUNTER (OUTPATIENT)
Dept: URBAN - METROPOLITAN AREA CLINIC 121 | Facility: CLINIC | Age: 82
End: 2022-04-30

## 2022-05-01 ENCOUNTER — TELEPHONE ENCOUNTER (OUTPATIENT)
Dept: URBAN - METROPOLITAN AREA CLINIC 121 | Facility: CLINIC | Age: 82
End: 2022-05-01

## 2022-05-01 RX ORDER — POLYETHYLENE GLYCOL 3350, SODIUM SULFATE, SODIUM CHLORIDE, POTASSIUM CHLORIDE, ASCORBIC ACID, SODIUM ASCORBATE 7.5-2.691G
MIX AND USE AS DIRECTED KIT ORAL
Status: ACTIVE | COMMUNITY
Start: 2016-04-26

## 2022-05-01 RX ORDER — TORSEMIDE 100 MG/1
TABLET ORAL
Status: ACTIVE | COMMUNITY
Start: 2016-04-26

## 2022-05-01 RX ORDER — CINACALCET HYDROCHLORIDE 60 MG/1
TABLET, COATED ORAL
Status: ACTIVE | COMMUNITY
Start: 2016-04-26